# Patient Record
Sex: MALE | URBAN - METROPOLITAN AREA
[De-identification: names, ages, dates, MRNs, and addresses within clinical notes are randomized per-mention and may not be internally consistent; named-entity substitution may affect disease eponyms.]

---

## 2024-04-30 ENCOUNTER — OV NP (OUTPATIENT)
Dept: URBAN - METROPOLITAN AREA CLINIC 105 | Facility: CLINIC | Age: 29
End: 2024-04-30

## 2024-04-30 VITALS
HEIGHT: 76 IN | WEIGHT: 230 LBS | DIASTOLIC BLOOD PRESSURE: 63 MMHG | HEART RATE: 78 BPM | BODY MASS INDEX: 28.01 KG/M2 | SYSTOLIC BLOOD PRESSURE: 136 MMHG | TEMPERATURE: 97.2 F

## 2024-04-30 NOTE — PHYSICAL EXAM GASTROINTESTINAL
Abdomen , nondistended Rectal , normal sphincter tone, non-thrombosed and non-prolapsed internal hemorrhoids, no external hemorrhoids, no rectal masses, no bleeding present. A chaperone was present during the rectal examination

## 2024-04-30 NOTE — HPI-ZZZTODAY'S VISIT
28-year-old male with no significant PMH, presenting to discuss rectal bleeding. He was seen at urgent care 4/25/2024, where guaiac tested positive and gross blood on glove was noted. Today, pt states that on Thursday he went to use the bathroom and the toilet was full of blood. Denies associated pain. Went to urgent care that day and they told him that they didn't see any tears or hemorrhoids. They recommended going to ER but instead he scheduled outpatient appt. He has not had rectal bleeding to the extent of the first day, but he does still note blood when wiping. Denies melena. States he has had issues with constipation for the last 1.5 yrs. He associates that with getting sober. He was taking laxatives (DulcoLax) regularly until 2 months ago. He currently has 3-4 BMs/week with straining. Notes they are "small." Can go 4-5 days without BM.  Denies abdominal pain, N/V. Has never had colonoscopy. Pt states there is family history of cancer, but he is unsure what kind. Notes his dad has "GI issues" but does not know any details of this.

## 2024-05-30 ENCOUNTER — OFFICE VISIT (OUTPATIENT)
Dept: URBAN - METROPOLITAN AREA SURGERY CENTER 16 | Facility: SURGERY CENTER | Age: 29
End: 2024-05-30

## 2024-06-24 ENCOUNTER — DASHBOARD ENCOUNTERS (OUTPATIENT)
Age: 29
End: 2024-06-24

## 2024-07-02 ENCOUNTER — OFFICE VISIT (OUTPATIENT)
Dept: URBAN - METROPOLITAN AREA CLINIC 105 | Facility: CLINIC | Age: 29
End: 2024-07-02

## 2024-07-02 RX ORDER — AMOXICILLIN AND CLAVULANATE POTASSIUM 875; 125 MG/1; MG/1
TAKE 1 TABLET BY MOUTH IN THE MORINING AND 1 BEFORE BEDTIME. DO ALL THIS FOR 10 DAYS TABLET, FILM COATED ORAL
Qty: 20 EACH | Refills: 0 | Status: ACTIVE | COMMUNITY